# Patient Record
Sex: MALE | URBAN - METROPOLITAN AREA
[De-identification: names, ages, dates, MRNs, and addresses within clinical notes are randomized per-mention and may not be internally consistent; named-entity substitution may affect disease eponyms.]

---

## 2022-06-25 ENCOUNTER — NURSE TRIAGE (OUTPATIENT)
Dept: NURSING | Facility: CLINIC | Age: 82
End: 2022-06-25

## 2022-06-25 NOTE — TELEPHONE ENCOUNTER
Symptoms started 6/23/22    He has symptoms of cough, and congestion and chills    No fever    No chest pain    Slight shortness of breath    Slight diarrhea    Advised that he be seen today and if they cannot find and [facility] close to them then would recommend going into ED.    The patient indicates understanding of these issues and agrees with the plan.    Nelli Bender RN  [facility] Nurse Advisor  1:35 PM  6/25/2022      Reason for Disposition    MILD difficulty breathing (e.g., minimal/no SOB at rest, SOB with walking, pulse <100)    Additional Information    Negative: SEVERE difficulty breathing (e.g., struggling for each breath, speaks in single words)    Negative: Difficult to awaken or acting confused (e.g., disoriented, slurred speech)    Negative: Bluish (or gray) lips or face now    Negative: Shock suspected (e.g., cold/pale/clammy skin, too weak to stand, low BP, rapid pulse)    Negative: Sounds like a life-threatening emergency to the triager    Negative: SEVERE or constant chest pain or pressure  (Exception: Mild central chest pain, present only when coughing.)    Negative: MODERATE difficulty breathing (e.g., speaks in phrases, SOB even at rest, pulse 100-120)    Negative: [1] Headache AND [2] stiff neck (can't touch chin to chest)    Negative: Oxygen level (e.g., pulse oximetry) 90 percent or lower    Negative: Chest pain or pressure    Negative: Patient sounds very sick or weak to the triager    Protocols used: CORONAVIRUS (COVID-19) DIAGNOSED OR [id number] 1.18.2022